# Patient Record
Sex: FEMALE | Race: WHITE | NOT HISPANIC OR LATINO | Employment: OTHER | URBAN - METROPOLITAN AREA
[De-identification: names, ages, dates, MRNs, and addresses within clinical notes are randomized per-mention and may not be internally consistent; named-entity substitution may affect disease eponyms.]

---

## 2024-10-05 ENCOUNTER — OFFICE VISIT (OUTPATIENT)
Dept: URGENT CARE | Facility: CLINIC | Age: 89
End: 2024-10-05
Payer: MEDICARE

## 2024-10-05 VITALS
SYSTOLIC BLOOD PRESSURE: 165 MMHG | BODY MASS INDEX: 29.93 KG/M2 | RESPIRATION RATE: 16 BRPM | HEART RATE: 66 BPM | WEIGHT: 190.69 LBS | HEIGHT: 67 IN | DIASTOLIC BLOOD PRESSURE: 72 MMHG | OXYGEN SATURATION: 97 % | TEMPERATURE: 98 F

## 2024-10-05 DIAGNOSIS — L03.114 CELLULITIS OF LEFT FOREARM: ICD-10-CM

## 2024-10-05 DIAGNOSIS — I10 ELEVATED BLOOD PRESSURE READING IN OFFICE WITH DIAGNOSIS OF HYPERTENSION: ICD-10-CM

## 2024-10-05 DIAGNOSIS — S51.812A SKIN TEAR OF LEFT FOREARM WITHOUT COMPLICATION, INITIAL ENCOUNTER: Primary | ICD-10-CM

## 2024-10-05 PROCEDURE — 99204 OFFICE O/P NEW MOD 45 MIN: CPT | Mod: S$GLB,,, | Performed by: NURSE PRACTITIONER

## 2024-10-05 RX ORDER — CEPHALEXIN 500 MG/1
500 CAPSULE ORAL EVERY 6 HOURS
Qty: 28 CAPSULE | Refills: 0 | Status: SHIPPED | OUTPATIENT
Start: 2024-10-05 | End: 2024-10-12

## 2024-10-05 NOTE — PATIENT INSTRUCTIONS
Keep arm clean.     Take antibiotics as prescribed    Please follow up with your Primary care provider within 2-5 days if your signs and symptoms have not resolved or worsen.      If your condition worsens or fails to improve we recommend that you receive another evaluation at the emergency room immediately or contact your primary medical clinic to discuss your concerns.     You must understand that you have received an Urgent Care treatment only and that you may be released before all of your medical problems are known or treated. You, the patient, will arrange for follow up care as instructed.

## 2024-10-05 NOTE — PROGRESS NOTES
"Subjective:      Patient ID: Andie Friedman is a 89 y.o. female.    Vitals:  height is 5' 7" (1.702 m) and weight is 86.5 kg (190 lb 11.2 oz). Her oral temperature is 97.7 °F (36.5 °C). Her blood pressure is 165/72 (abnormal) and her pulse is 66. Her respiration is 16 and oxygen saturation is 97%.     Chief Complaint: Arm Injury    Patient was a 72-year-old female who presents for evaluation of redness to the left arm.  She states about 3 days ago she sustained a small skin tear and since then the area around has become red and tender.  She states it is warm to the touch.  No medications used to treat symptoms.  Denies fever, dyspnea, wheezing, numbness, tingling or drainage from the wound.  No other concerns voiced.    Arm Injury   Her dominant hand is their left hand. Incident onset: 3 days. The incident occurred at home. Quality: itching. The pain does not radiate. The pain is at a severity of 0/10. The patient is experiencing no pain. Pertinent negatives include no numbness. Nothing aggravates the symptoms. The treatment provided no relief.       Constitution: Negative for chills and fever.   Respiratory:  Negative for shortness of breath.    Gastrointestinal:  Negative for nausea and vomiting.   Musculoskeletal:  Negative for joint pain and joint swelling.   Skin:  Positive for wound and erythema.   Neurological:  Negative for numbness and tingling.      Objective:     Physical Exam   Constitutional: She is oriented to person, place, and time. She appears well-developed. She is cooperative.   HENT:   Head: Normocephalic and atraumatic.   Ears:   Right Ear: Hearing and external ear normal.   Left Ear: Hearing and external ear normal.   Nose: Nose normal. No mucosal edema or nasal deformity. No epistaxis. Right sinus exhibits no maxillary sinus tenderness and no frontal sinus tenderness. Left sinus exhibits no maxillary sinus tenderness and no frontal sinus tenderness.   Mouth/Throat: Uvula is midline, " oropharynx is clear and moist and mucous membranes are normal. Mucous membranes are moist. No trismus in the jaw. Normal dentition. No uvula swelling. Oropharynx is clear.   Eyes: Conjunctivae and lids are normal.   Neck: Trachea normal and phonation normal. Neck supple.   Cardiovascular: Normal rate, regular rhythm, normal heart sounds and normal pulses.   Pulmonary/Chest: Effort normal and breath sounds normal. No respiratory distress. She has no wheezes. She has no rhonchi. She has no rales.   Abdominal: Normal appearance and bowel sounds are normal. Soft.   Musculoskeletal: Normal range of motion.         General: Normal range of motion.   Neurological: She is alert and oriented to person, place, and time. She exhibits normal muscle tone.   Skin: Skin is warm, dry and intact. Capillary refill takes less than 2 seconds. erythema         Comments: Left forearm, 0.5 cm round skin tear along the ulnar side. Mild swelling, surrounding erythema extending to about 4 cm. No induration or fluctuance. Area is warm and tender to palpation   Psychiatric: Her speech is normal and behavior is normal. Judgment and thought content normal.   Nursing note and vitals reviewed.      Assessment:     1. Skin tear of left forearm without complication, initial encounter    2. Cellulitis of left forearm    3. Elevated blood pressure reading in office with diagnosis of hypertension        Plan:       Skin tear of left forearm without complication, initial encounter    Cellulitis of left forearm    Elevated blood pressure reading in office with diagnosis of hypertension    Other orders  -     cephALEXin (KEFLEX) 500 MG capsule; Take 1 capsule (500 mg total) by mouth every 6 (six) hours. for 7 days  Dispense: 28 capsule; Refill: 0          Medical Decision Making:   Initial Assessment:   Nontoxic appearing 89 yo female c/o skin tear.  This patient presents with initial presentation of local erythema, warmth, swelling concerning for  cellulitis. Sensitivity/pain to light touch around the erythematous area. No lymphangitic spread visible and no fluid pockets or fluctuance concerning for abscess noted. Low concern for osteomyelitis or DVT. No immune compromise, bullae, pain out of proportion, or rapid progression concerning for necrotizing fasciitis. Patient to be discharged home with prescription for keflex . Provided with follow up instructions and ER precautions.       Patient is seen in clinic with known history of essential hypertension noted to be elevated above goal today in clinic.  Patient was counseled that blood pressure was elevated. I advised adherence to current medication regime, low-salt heart smart diet, exercise and self-monitoring.  Patient follow up with primary physician for long-term management adjustments as needed.